# Patient Record
Sex: FEMALE | Race: WHITE | Employment: PART TIME | URBAN - NONMETROPOLITAN AREA
[De-identification: names, ages, dates, MRNs, and addresses within clinical notes are randomized per-mention and may not be internally consistent; named-entity substitution may affect disease eponyms.]

---

## 2021-10-06 ENCOUNTER — HOSPITAL ENCOUNTER (OUTPATIENT)
Age: 51
Discharge: HOME | End: 2021-10-06
Payer: COMMERCIAL

## 2021-10-06 ENCOUNTER — HOSPITAL ENCOUNTER (OUTPATIENT)
Dept: GENERAL RADIOLOGY | Age: 51
Discharge: HOME | End: 2021-10-06
Payer: COMMERCIAL

## 2021-10-06 ENCOUNTER — HOSPITAL ENCOUNTER (OUTPATIENT)
Dept: CT IMAGING | Age: 51
Discharge: HOME | End: 2021-10-06

## 2021-10-06 ENCOUNTER — OFFICE VISIT (OUTPATIENT)
Dept: ENT CLINIC | Age: 51
End: 2021-10-06
Payer: COMMERCIAL

## 2021-10-06 ENCOUNTER — PREP FOR PROCEDURE (OUTPATIENT)
Dept: ENT CLINIC | Age: 51
End: 2021-10-06

## 2021-10-06 VITALS
BODY MASS INDEX: 28.12 KG/M2 | RESPIRATION RATE: 16 BRPM | OXYGEN SATURATION: 97 % | HEIGHT: 66 IN | SYSTOLIC BLOOD PRESSURE: 124 MMHG | HEART RATE: 64 BPM | WEIGHT: 175 LBS | TEMPERATURE: 97 F | DIASTOLIC BLOOD PRESSURE: 84 MMHG

## 2021-10-06 DIAGNOSIS — Z01.818 PRE-OP TESTING: ICD-10-CM

## 2021-10-06 DIAGNOSIS — Z00.6 EXAMINATION FOR NORMAL COMPARISON FOR CLINICAL RESEARCH: ICD-10-CM

## 2021-10-06 DIAGNOSIS — J34.3 HYPERTROPHY OF BOTH INFERIOR NASAL TURBINATES: ICD-10-CM

## 2021-10-06 DIAGNOSIS — J34.89 NASAL OBSTRUCTION: ICD-10-CM

## 2021-10-06 DIAGNOSIS — J34.2 NASAL SEPTAL DEVIATION: ICD-10-CM

## 2021-10-06 DIAGNOSIS — S02.2XXA FRACTURE OF NASAL BONES, INITIAL ENCOUNTER FOR CLOSED FRACTURE: Primary | ICD-10-CM

## 2021-10-06 DIAGNOSIS — J34.89 CONCHA BULLOSA: ICD-10-CM

## 2021-10-06 DIAGNOSIS — Z01.818 PRE-OP TESTING: Primary | ICD-10-CM

## 2021-10-06 LAB
ALBUMIN SERPL-MCNC: 4.3 G/DL (ref 3.5–5.1)
ALP BLD-CCNC: 121 U/L (ref 38–126)
ALT SERPL-CCNC: 30 U/L (ref 11–66)
ANION GAP SERPL CALCULATED.3IONS-SCNC: 11 MEQ/L (ref 8–16)
AST SERPL-CCNC: 31 U/L (ref 5–40)
BASOPHILS # BLD: 0.6 %
BASOPHILS ABSOLUTE: 0 THOU/MM3 (ref 0–0.1)
BILIRUB SERPL-MCNC: 0.5 MG/DL (ref 0.3–1.2)
BUN BLDV-MCNC: 6 MG/DL (ref 7–22)
CALCIUM SERPL-MCNC: 9.6 MG/DL (ref 8.5–10.5)
CHLORIDE BLD-SCNC: 104 MEQ/L (ref 98–111)
CO2: 26 MEQ/L (ref 23–33)
CREAT SERPL-MCNC: 0.7 MG/DL (ref 0.4–1.2)
EKG ATRIAL RATE: 60 BPM
EKG P AXIS: 67 DEGREES
EKG P-R INTERVAL: 188 MS
EKG Q-T INTERVAL: 456 MS
EKG QRS DURATION: 98 MS
EKG QTC CALCULATION (BAZETT): 456 MS
EKG R AXIS: 20 DEGREES
EKG T AXIS: 28 DEGREES
EKG VENTRICULAR RATE: 60 BPM
EOSINOPHIL # BLD: 0.5 %
EOSINOPHILS ABSOLUTE: 0 THOU/MM3 (ref 0–0.4)
ERYTHROCYTE [DISTWIDTH] IN BLOOD BY AUTOMATED COUNT: 13.8 % (ref 11.5–14.5)
ERYTHROCYTE [DISTWIDTH] IN BLOOD BY AUTOMATED COUNT: 56.9 FL (ref 35–45)
GFR SERPL CREATININE-BSD FRML MDRD: 87 ML/MIN/1.73M2
GLUCOSE BLD-MCNC: 89 MG/DL (ref 70–108)
HCT VFR BLD CALC: 48.9 % (ref 37–47)
HEMOGLOBIN: 16.1 GM/DL (ref 12–16)
IMMATURE GRANS (ABS): 0.03 THOU/MM3 (ref 0–0.07)
IMMATURE GRANULOCYTES: 0.4 %
LYMPHOCYTES # BLD: 19 %
LYMPHOCYTES ABSOLUTE: 1.5 THOU/MM3 (ref 1–4.8)
MACROCYTES: PRESENT
MCH RBC QN AUTO: 36.3 PG (ref 26–33)
MCHC RBC AUTO-ENTMCNC: 32.9 GM/DL (ref 32.2–35.5)
MCV RBC AUTO: 110.1 FL (ref 81–99)
MONOCYTES # BLD: 7.2 %
MONOCYTES ABSOLUTE: 0.6 THOU/MM3 (ref 0.4–1.3)
NUCLEATED RED BLOOD CELLS: 0 /100 WBC
PLATELET # BLD: 252 THOU/MM3 (ref 130–400)
PLATELET ESTIMATE: ADEQUATE
PMV BLD AUTO: 9.9 FL (ref 9.4–12.4)
POTASSIUM SERPL-SCNC: 4 MEQ/L (ref 3.5–5.2)
RBC # BLD: 4.44 MILL/MM3 (ref 4.2–5.4)
SCAN OF BLOOD SMEAR: NORMAL
SEG NEUTROPHILS: 72.3 %
SEGMENTED NEUTROPHILS ABSOLUTE COUNT: 5.8 THOU/MM3 (ref 1.8–7.7)
SODIUM BLD-SCNC: 141 MEQ/L (ref 135–145)
TOTAL PROTEIN: 7 G/DL (ref 6.1–8)
WBC # BLD: 8 THOU/MM3 (ref 4.8–10.8)

## 2021-10-06 PROCEDURE — 99204 OFFICE O/P NEW MOD 45 MIN: CPT | Performed by: OTOLARYNGOLOGY

## 2021-10-06 PROCEDURE — 80053 COMPREHEN METABOLIC PANEL: CPT

## 2021-10-06 PROCEDURE — 85025 COMPLETE CBC W/AUTO DIFF WBC: CPT

## 2021-10-06 PROCEDURE — 36415 COLL VENOUS BLD VENIPUNCTURE: CPT

## 2021-10-06 PROCEDURE — 93005 ELECTROCARDIOGRAM TRACING: CPT

## 2021-10-06 PROCEDURE — 71046 X-RAY EXAM CHEST 2 VIEWS: CPT

## 2021-10-06 RX ORDER — AMOXICILLIN 500 MG/1
500 CAPSULE ORAL
COMMUNITY
Start: 2021-09-28 | End: 2021-10-06

## 2021-10-06 RX ORDER — FERROUS SULFATE 325(65) MG
TABLET ORAL
COMMUNITY
Start: 2021-09-02

## 2021-10-06 RX ORDER — FAMOTIDINE 40 MG/1
TABLET, FILM COATED ORAL NIGHTLY
COMMUNITY
Start: 2021-09-02

## 2021-10-06 RX ORDER — LISINOPRIL 20 MG/1
TABLET ORAL DAILY
COMMUNITY
Start: 2021-10-04

## 2021-10-06 RX ORDER — METOPROLOL SUCCINATE 25 MG/1
25 TABLET, EXTENDED RELEASE ORAL DAILY
COMMUNITY

## 2021-10-06 RX ORDER — AMLODIPINE BESYLATE 5 MG/1
5 TABLET ORAL DAILY
COMMUNITY
Start: 2021-10-04

## 2021-10-06 RX ORDER — HYDROCODONE BITARTRATE AND ACETAMINOPHEN 5; 325 MG/1; MG/1
TABLET ORAL
COMMUNITY
Start: 2021-09-28

## 2021-10-06 RX ORDER — OMEPRAZOLE 40 MG/1
CAPSULE, DELAYED RELEASE ORAL
COMMUNITY
Start: 2021-09-01

## 2021-10-06 NOTE — PROGRESS NOTES
In preparation for their surgical procedure above patient was screened for Obstructive Sleep Apnea (NASRIN) using the STOP-Bang Questionnaire by the Pre-Admission Testing department. This is a pre-surgical screening tool for patient safety and serves as a recommendation, this WILL NOT cause cancellation of surgery. STOP-Bang Questionnaire  * Do you currently see a pulmonologist?  No     If yes STOP, do not complete. Patient follows with  .    1. Do you snore loudly (able to be heard in the next room)? No    2. Do you often feel tired or sleepy during the daytime? No       3. Has anyone ever told you that you stop breathing during your sleep? No    4. Do you have or are you being treated for high blood pressure? Yes      5. BMI more than 35? BMI (Calculated): 28.3        No    6. Age over 48 years? 46 y.o. Yes    7. Neck Circumference greater than 17 inches for male or 16 inches for female? Measured           (visits only)            Not Applicable    8. Gender Male? No      TOTAL SCORE: 2    NASRIN - Low Risk : Yes to 0 - 2 questions  NASRIN - Intermediate Risk : Yes to 3 - 4 questions  NASRIN - High Risk : Yes to 5 - 8 questions    Adapted from:   STOP Questionnaire: A Tool to Screen Patients for Obstructive Sleep Apnea   LUCILLE Roldan.C.P.C., Gadiel Mariee M.B.B.S., Angelique Ascencio M.D., [facility]. Sameera Williamson, Ph.D., STEPHEN Martinez.B.B.S., Shirin Tomas M.Sc., Anh Heaton M.D., [facility]. LUCILLE Benedict.C.P.C.    Anesthesiology 2008; 522:753-55 Copyright 2008, the [address] of [employer]esthesi[employer], [facility].   ----------------------------------------------------------------------------------------------------------------

## 2021-10-06 NOTE — PATIENT INSTRUCTIONS
Nasal and Sinus Irrigations (Isotonic Saline)  Purpose  Nasal and sinus irrigations help to remove crusts, dried blood, clots, mucus, etc. from  the nose. In addition, these irrigations may also decrease the swelling in the nasal and  sinus linings. These irrigations are often used on a long-term basis to keep the nose  and sinuses clean. You can purchase a bottle and saline packets at any pharmacy or  you can make your own (see below). Isotonic Saline  1 teaspoon of marcie/pickling salt (not table salt)  1/2 teaspoon of baking soda  1 quart of water (tap water OK; bottled or distilled water OK; not well water)    Directions   Use a large piston-type syringe (also known as an irrigating syringe) or a nasal  squeeze bottle (see below). Avoid bulb-type syringes.  Place the tip of the syringe (or squeeze bottle) at the nostril and irrigate with  the saline mixture. The irrigation should be rigorous, since it cleans the nose  by mechanically removing debris, crusts, mucus, etc. The irrigation should be  forceful  like washing dirt from a driveway with water from a hose.  Irrigate each nostril with 1 cup of the saline mixture 3-4 times per day.  After each irrigation, simply wash the syringe (or squeeze bottle) with  ordinary tap water and mild soap. Let the syringe air-dry. Separate the barrel  and piston of the syringe for cleaning.  When the syringe (or squeeze bottle) becomes permanently soiled or if it  becomes difficult to use, replace it with a new one.  If using for ? 3 months, please dispense of your bottle and obtain a new one.

## 2021-10-06 NOTE — PROGRESS NOTES
[address], NOSE AND THROAT  [facility]  Dept: [phone]  Dept Fax: [phone]: [phone]       Dear No ref. provider found, thank you for referring Teresa Augustin in consultation for a chief complaint of: nasal bone fracture . My full evaluation is as follows:     HPI:     As you recall, Teresa Augustin is a 46 y.o. female who presents today for evaluation of her nasal bone fracture. On September 28 she fell on her deck and hit her face. She had epistaxis for several minutes afterwards. She had bilateral periorbital bruising and facial swelling. She could not breathe out of her nose for several days afterwards. Her nasal airway has improved a little bit, but is not back to normal. Her nasal bones are also visibly displaced. She denies any vision changes. She is currently trying to quit smoking, but still smokes half pack per day. She has no other medical problems. History:     No Known Allergies  Current Outpatient Medications   Medication Sig Dispense Refill    amLODIPine (NORVASC) 5 MG tablet 5 mg daily      amoxicillin (AMOXIL) 500 MG capsule 500 mg 1-2 times a day for 10 days. Started 9/28      famotidine (PEPCID) 40 MG tablet TAKE 1 TABLET BY MOUTH EVERYDAY AT BEDTIME      ferrous sulfate (IRON 325) 325 (65 Fe) MG tablet TAKE 1 TABLET BY MOUTH EVERY DAY      HYDROcodone-acetaminophen (NORCO) 5-325 MG per tablet TAKE 1 (ONE) TABLET BY MOUTH EVERY 6 (SIX) HOURS AS NEEDED FOR PAIN (DAYS SUPPLY PER FILL  5) .       lisinopril (PRINIVIL;ZESTRIL) 20 MG tablet       omeprazole (PRILOSEC) 40 MG delayed release capsule TAKE 1 CAPSULE BY MOUTH TWICE A DAY      oxyCODONE-acetaminophen (PERCOCET) 5-325 MG per tablet Take 1 tablet by mouth every 6 hours as needed for Pain 30 tablet 0    b complex vitamins capsule Take 1 capsule by mouth daily      calcium carbonate (OSCAL) 500 MG TABS tablet Take 500 mg by mouth daily      Vitamin D (CHOLECALCIFEROL) 1000 UNITS CAPS capsule Take 1,000 Units by mouth daily      Multiple Vitamins-Minerals (THERAPEUTIC MULTIVITAMIN-MINERALS) tablet Take 1 tablet by mouth daily       No current facility-administered medications for this visit. Past Medical History:   Diagnosis Date    H/O bariatric surgery     2013      Past Surgical History:   Procedure Laterality Date    ANUS SURGERY  2015    BARIATRIC SURGERY  13    [place]     SECTION  80, 80    COLONOSCOPY      TUBAL LIGATION  90     Family History   Problem Relation Age of Onset    Heart Disease Father     Early Death Father 52        MI    Diabetes Father     Diabetes Sister     Cancer Maternal Aunt     Heart Disease Paternal Aunt     Diabetes Paternal Aunt     Cancer Maternal Grandmother     Heart Disease Paternal Grandmother     Diabetes Paternal Grandfather      Social History     Tobacco Use    Smoking status: Current Every Day Smoker     Packs/day: 0.50     Types: Cigarettes    Smokeless tobacco: Never Used   Substance Use Topics    Alcohol use: Yes     Alcohol/week: 0.0 standard drinks     Comment: social       All of the past medical history, past surgical history, family history,social history, allergies and current medications were reviewed with the patient. Review of Systems      A complete review of systems was obtained by the patient intake form, which is attached to this visit. Objective:   /84 (Site: Left Upper Arm, Position: Sitting)   Pulse 64   Temp 97 °F (36.1 °C) (Infrared)   Resp 16   Ht 5' 6\" (1.676 m)   Wt 175 lb (79.4 kg)   SpO2 97%   BMI 28.25 kg/m²     PHYSICAL EXAM  ABNORMAL OTOLARYNGOLOGIC EXAM FINDINGS: Her bony dorsum is deviated to the left with palpably displaced shards of bone. This is very tender to palpation. On anterior rhinoscopy, she has septal swelling and complete obstruction of her nasal airways bilaterally.  There is periorbital ecchymosis and she is tender over her infraorbital rims. Extraocular motions are intact. No facial numbness. OTHER PERTINENT EXAM FINDINGS:  GENERAL: Awake, NAD, Non-toxic appearing. Normal voice quality  NEUROLOGICAL: GCS 15, Cranial nerves II-VI, VIII-XII grossly intact,  Facial nerve (VII) w/ House-Brackman Grade 1 of 6 bilaterally, No evidence of nystagmus or gross asymmetry    EARS: Pinna well-formed,  EAC non-stenotic w/o cerumen impaction AU,  TM's intact AU w/o effusion or active infection appreciated  EYES: EOMI, No ptosis appreciated   NOSE: Dorsum w/o scar or deformity,  No mucopurulence appreciated, Patent nasal airways bilaterally. No inferior turbinate hypertrophy. No septal deviation. ORAL CAVITY: No mucosal masses/lesions appreciated, Tongue with FROM. Appropriate OLGA LIDIA w/o trismus. OROPHARYNX: No mucosal masses or lesions appreciated. Symmetric palatal elevation w/o draping. NECK: Soft, supple. No crepitus or masses appreciated,  Trachea midline. No thyromegaly or thyroid tenderness. Data: The relevant prior clinic notes were reviewed today, including the referring physicians notes. Imaging: CT face was obtained in the emergency room on September 28. I personally reviewed these images. They revealed a comminuted, displaced nasal bone fracture with the bony mid vault deviated to the left. She also has a large left-sided kiana bullosa, and an absent right sided frontal sinus. She has a chronic rightward septal deviation secondary to the kiana bullosa, and bilateral inferior turbinate hypertrophy. Assessment & Plan   Baltazar Salazar is a 46 y.o. female with:   1. Fracture of nasal bones, initial encounter for closed fracture    2. Nasal obstruction    3. Kiana bullosa    4. Hypertrophy of both inferior nasal turbinates    5. Nasal septal deviation         We discussed treatment options for the nasal bone fracture.  Since she has significant nasal obstruction at this time, and there is an external deformity, we will proceed with closed nasal reduction. We had a detailed discussion of the risk, benefits, and alternatives to the surgery. The risk include infection, bleeding, worsening or damaging of the nerves that help with smell, need for further surgery, damage to adjacent structures including the eyes and brain. Changes in vision, double vision, spinal fluid leak, hole in the septum are very unlikely but theoretically possible. Informed consent was obtained and signed in clinic today. The surgery will be scheduled shortly. My advanced practice providers will see her back 7 to 10 days postoperatively. We discussed nasal saline irrigations to aid in the healing process. I recommend no strenuous activity or contact sports for the next few weeks. PROCEDURAL INFORMED CONSENT FOR OPERATION/PROCEDURE    1. The indications and rationale for the procedure, the nature of the procedure, and the extent of the procedure have been explained. 2. The likelihood of success and significant risks that occur with any reasonable frequency, or  have been explained. With any procedure, there are potential adverse events that might occur, that are rare and cannot be foreseen. 3. Alternative methods of treatment have either already been exhausted or explained. Potential benefits and risks of such alternative treatments have been explained. The option to pursue such alternatives has been offered. 4. Expected immediate and long-term consequences to the patient's health have been discussed, where applicable. 5. No guarantees of any kind were made, including, but not limited to, successful outcomes or lack of complications. 6. All questions were answered, and an invitation made to call if other questions arise        No follow-ups on file. Thank you for involving me in this patient's care.  Please do not hesitate to call with any questions or concerns. Sincerely,    Andreia San M.D. Otolaryngology-Head and Neck Surgery    **This report has been created using voice recognition software. It may contain minor errors which are inherent in voice recognition technology. **

## 2021-10-07 ENCOUNTER — ANESTHESIA EVENT (OUTPATIENT)
Dept: OPERATING ROOM | Age: 51
End: 2021-10-07
Payer: COMMERCIAL

## 2021-10-08 ENCOUNTER — ANESTHESIA (OUTPATIENT)
Dept: OPERATING ROOM | Age: 51
End: 2021-10-08
Payer: COMMERCIAL

## 2021-10-08 ENCOUNTER — HOSPITAL ENCOUNTER (OUTPATIENT)
Age: 51
Setting detail: OUTPATIENT SURGERY
Discharge: HOME | End: 2021-10-08
Attending: OTOLARYNGOLOGY | Admitting: OTOLARYNGOLOGY
Payer: COMMERCIAL

## 2021-10-08 VITALS
SYSTOLIC BLOOD PRESSURE: 110 MMHG | WEIGHT: 176.2 LBS | TEMPERATURE: 97.5 F | BODY MASS INDEX: 28.32 KG/M2 | DIASTOLIC BLOOD PRESSURE: 58 MMHG | OXYGEN SATURATION: 96 % | RESPIRATION RATE: 16 BRPM | HEART RATE: 60 BPM | HEIGHT: 66 IN

## 2021-10-08 VITALS — TEMPERATURE: 98.6 F | DIASTOLIC BLOOD PRESSURE: 59 MMHG | OXYGEN SATURATION: 100 % | SYSTOLIC BLOOD PRESSURE: 105 MMHG

## 2021-10-08 DIAGNOSIS — S02.2XXA CLOSED FRACTURE OF NASAL BONE, INITIAL ENCOUNTER: Primary | ICD-10-CM

## 2021-10-08 PROCEDURE — 7100000000 HC PACU RECOVERY - FIRST 15 MIN: Performed by: OTOLARYNGOLOGY

## 2021-10-08 PROCEDURE — 7100000001 HC PACU RECOVERY - ADDTL 15 MIN: Performed by: OTOLARYNGOLOGY

## 2021-10-08 PROCEDURE — 2580000003 HC RX 258: Performed by: OTOLARYNGOLOGY

## 2021-10-08 PROCEDURE — 7100000010 HC PHASE II RECOVERY - FIRST 15 MIN: Performed by: OTOLARYNGOLOGY

## 2021-10-08 PROCEDURE — 6370000000 HC RX 637 (ALT 250 FOR IP): Performed by: OTOLARYNGOLOGY

## 2021-10-08 PROCEDURE — 3600000002 HC SURGERY LEVEL 2 BASE: Performed by: OTOLARYNGOLOGY

## 2021-10-08 PROCEDURE — 2709999900 HC NON-CHARGEABLE SUPPLY: Performed by: OTOLARYNGOLOGY

## 2021-10-08 PROCEDURE — 6360000002 HC RX W HCPCS

## 2021-10-08 PROCEDURE — C1713 ANCHOR/SCREW BN/BN,TIS/BN: HCPCS | Performed by: OTOLARYNGOLOGY

## 2021-10-08 PROCEDURE — 21320 CLSD TX NSL FX W/MNPJ&STABLJ: CPT | Performed by: OTOLARYNGOLOGY

## 2021-10-08 PROCEDURE — 2500000003 HC RX 250 WO HCPCS

## 2021-10-08 PROCEDURE — 7100000011 HC PHASE II RECOVERY - ADDTL 15 MIN: Performed by: OTOLARYNGOLOGY

## 2021-10-08 PROCEDURE — 3600000012 HC SURGERY LEVEL 2 ADDTL 15MIN: Performed by: OTOLARYNGOLOGY

## 2021-10-08 PROCEDURE — 3700000001 HC ADD 15 MINUTES (ANESTHESIA): Performed by: OTOLARYNGOLOGY

## 2021-10-08 PROCEDURE — 3700000000 HC ANESTHESIA ATTENDED CARE: Performed by: OTOLARYNGOLOGY

## 2021-10-08 RX ORDER — HYDROCODONE BITARTRATE AND ACETAMINOPHEN 5; 325 MG/1; MG/1
1 TABLET ORAL EVERY 4 HOURS PRN
Qty: 18 TABLET | Refills: 0 | Status: SHIPPED | OUTPATIENT
Start: 2021-10-08 | End: 2021-10-11

## 2021-10-08 RX ORDER — FENTANYL CITRATE 50 UG/ML
50 INJECTION, SOLUTION INTRAMUSCULAR; INTRAVENOUS EVERY 5 MIN PRN
Status: DISCONTINUED | OUTPATIENT
Start: 2021-10-08 | End: 2021-10-08 | Stop reason: HOSPADM

## 2021-10-08 RX ORDER — DEXAMETHASONE SODIUM PHOSPHATE 10 MG/ML
INJECTION, EMULSION INTRAMUSCULAR; INTRAVENOUS PRN
Status: DISCONTINUED | OUTPATIENT
Start: 2021-10-08 | End: 2021-10-08 | Stop reason: SDUPTHER

## 2021-10-08 RX ORDER — FENTANYL CITRATE 50 UG/ML
INJECTION, SOLUTION INTRAMUSCULAR; INTRAVENOUS PRN
Status: DISCONTINUED | OUTPATIENT
Start: 2021-10-08 | End: 2021-10-08 | Stop reason: SDUPTHER

## 2021-10-08 RX ORDER — SUCCINYLCHOLINE/SOD CL,ISO/PF 200MG/10ML
SYRINGE (ML) INTRAVENOUS PRN
Status: DISCONTINUED | OUTPATIENT
Start: 2021-10-08 | End: 2021-10-08 | Stop reason: SDUPTHER

## 2021-10-08 RX ORDER — LABETALOL 20 MG/4 ML (5 MG/ML) INTRAVENOUS SYRINGE
5 EVERY 10 MIN PRN
Status: DISCONTINUED | OUTPATIENT
Start: 2021-10-08 | End: 2021-10-08 | Stop reason: HOSPADM

## 2021-10-08 RX ORDER — MIDAZOLAM HYDROCHLORIDE 1 MG/ML
INJECTION INTRAMUSCULAR; INTRAVENOUS PRN
Status: DISCONTINUED | OUTPATIENT
Start: 2021-10-08 | End: 2021-10-08 | Stop reason: SDUPTHER

## 2021-10-08 RX ORDER — PROPOFOL 10 MG/ML
INJECTION, EMULSION INTRAVENOUS PRN
Status: DISCONTINUED | OUTPATIENT
Start: 2021-10-08 | End: 2021-10-08 | Stop reason: SDUPTHER

## 2021-10-08 RX ORDER — SODIUM CHLORIDE 0.9 % (FLUSH) 0.9 %
5-40 SYRINGE (ML) INJECTION PRN
Status: CANCELLED | OUTPATIENT
Start: 2021-10-08

## 2021-10-08 RX ORDER — SODIUM CHLORIDE 0.9 % (FLUSH) 0.9 %
5-40 SYRINGE (ML) INJECTION EVERY 12 HOURS SCHEDULED
Status: DISCONTINUED | OUTPATIENT
Start: 2021-10-08 | End: 2021-10-08 | Stop reason: HOSPADM

## 2021-10-08 RX ORDER — FENTANYL CITRATE 50 UG/ML
25 INJECTION, SOLUTION INTRAMUSCULAR; INTRAVENOUS EVERY 5 MIN PRN
Status: DISCONTINUED | OUTPATIENT
Start: 2021-10-08 | End: 2021-10-08 | Stop reason: HOSPADM

## 2021-10-08 RX ORDER — ROCURONIUM BROMIDE 10 MG/ML
INJECTION, SOLUTION INTRAVENOUS PRN
Status: DISCONTINUED | OUTPATIENT
Start: 2021-10-08 | End: 2021-10-08 | Stop reason: SDUPTHER

## 2021-10-08 RX ORDER — SODIUM CHLORIDE 9 MG/ML
25 INJECTION, SOLUTION INTRAVENOUS PRN
Status: CANCELLED | OUTPATIENT
Start: 2021-10-08

## 2021-10-08 RX ORDER — ONDANSETRON 2 MG/ML
4 INJECTION INTRAMUSCULAR; INTRAVENOUS
Status: DISCONTINUED | OUTPATIENT
Start: 2021-10-08 | End: 2021-10-08 | Stop reason: HOSPADM

## 2021-10-08 RX ORDER — SODIUM CHLORIDE 0.9 % (FLUSH) 0.9 %
5-40 SYRINGE (ML) INJECTION PRN
Status: DISCONTINUED | OUTPATIENT
Start: 2021-10-08 | End: 2021-10-08 | Stop reason: HOSPADM

## 2021-10-08 RX ORDER — LIDOCAINE HYDROCHLORIDE 20 MG/ML
INJECTION, SOLUTION EPIDURAL; INFILTRATION; INTRACAUDAL; PERINEURAL PRN
Status: DISCONTINUED | OUTPATIENT
Start: 2021-10-08 | End: 2021-10-08 | Stop reason: SDUPTHER

## 2021-10-08 RX ORDER — ONDANSETRON 2 MG/ML
INJECTION INTRAMUSCULAR; INTRAVENOUS PRN
Status: DISCONTINUED | OUTPATIENT
Start: 2021-10-08 | End: 2021-10-08 | Stop reason: SDUPTHER

## 2021-10-08 RX ORDER — SODIUM CHLORIDE 9 MG/ML
25 INJECTION, SOLUTION INTRAVENOUS PRN
Status: DISCONTINUED | OUTPATIENT
Start: 2021-10-08 | End: 2021-10-08 | Stop reason: HOSPADM

## 2021-10-08 RX ORDER — HYDROCODONE BITARTRATE AND ACETAMINOPHEN 5; 325 MG/1; MG/1
1 TABLET ORAL ONCE
Status: COMPLETED | OUTPATIENT
Start: 2021-10-08 | End: 2021-10-08

## 2021-10-08 RX ORDER — SODIUM CHLORIDE 0.9 % (FLUSH) 0.9 %
5-40 SYRINGE (ML) INJECTION EVERY 12 HOURS SCHEDULED
Status: CANCELLED | OUTPATIENT
Start: 2021-10-08

## 2021-10-08 RX ADMIN — Medication 140 MG: at 09:05

## 2021-10-08 RX ADMIN — LIDOCAINE HYDROCHLORIDE 100 MG: 20 INJECTION, SOLUTION EPIDURAL; INFILTRATION; INTRACAUDAL; PERINEURAL at 09:05

## 2021-10-08 RX ADMIN — FENTANYL CITRATE 50 MCG: 50 INJECTION, SOLUTION INTRAMUSCULAR; INTRAVENOUS at 09:05

## 2021-10-08 RX ADMIN — HYDROCODONE BITARTRATE AND ACETAMINOPHEN 1 TABLET: 5; 325 TABLET ORAL at 10:25

## 2021-10-08 RX ADMIN — SODIUM CHLORIDE 1000 ML: 9 INJECTION, SOLUTION INTRAVENOUS at 08:09

## 2021-10-08 RX ADMIN — ONDANSETRON HYDROCHLORIDE 4 MG: 4 INJECTION, SOLUTION INTRAMUSCULAR; INTRAVENOUS at 09:14

## 2021-10-08 RX ADMIN — FENTANYL CITRATE 50 MCG: 50 INJECTION, SOLUTION INTRAMUSCULAR; INTRAVENOUS at 09:25

## 2021-10-08 RX ADMIN — MIDAZOLAM 2 MG: 1 INJECTION INTRAMUSCULAR; INTRAVENOUS at 08:58

## 2021-10-08 RX ADMIN — PROPOFOL 200 MG: 10 INJECTION, EMULSION INTRAVENOUS at 09:05

## 2021-10-08 RX ADMIN — DEXAMETHASONE SODIUM PHOSPHATE 10 MG: 10 INJECTION, EMULSION INTRAMUSCULAR; INTRAVENOUS at 08:58

## 2021-10-08 RX ADMIN — ROCURONIUM BROMIDE 5 MG: 10 INJECTION INTRAVENOUS at 09:05

## 2021-10-08 ASSESSMENT — PAIN SCALES - GENERAL
PAINLEVEL_OUTOF10: 0
PAINLEVEL_OUTOF10: 7
PAINLEVEL_OUTOF10: 3
PAINLEVEL_OUTOF10: 3
PAINLEVEL_OUTOF10: 7

## 2021-10-08 ASSESSMENT — PULMONARY FUNCTION TESTS
PIF_VALUE: 21
PIF_VALUE: 2
PIF_VALUE: 22
PIF_VALUE: 21
PIF_VALUE: 2
PIF_VALUE: 1
PIF_VALUE: 2
PIF_VALUE: 1
PIF_VALUE: 26
PIF_VALUE: 23
PIF_VALUE: 18
PIF_VALUE: 1
PIF_VALUE: 22
PIF_VALUE: 22
PIF_VALUE: 2
PIF_VALUE: 24
PIF_VALUE: 18
PIF_VALUE: 1
PIF_VALUE: 21
PIF_VALUE: 22
PIF_VALUE: 24
PIF_VALUE: 22
PIF_VALUE: 12
PIF_VALUE: 11
PIF_VALUE: 19
PIF_VALUE: 22

## 2021-10-08 ASSESSMENT — PAIN - FUNCTIONAL ASSESSMENT: PAIN_FUNCTIONAL_ASSESSMENT: 0-10

## 2021-10-08 ASSESSMENT — PAIN DESCRIPTION - PAIN TYPE: TYPE: SURGICAL PAIN

## 2021-10-08 ASSESSMENT — PAIN DESCRIPTION - LOCATION: LOCATION: FACE

## 2021-10-08 NOTE — H&P
[address]AR, NO[place] AND THROAT  [facility] [place]  Dept: [phone]  Dept Fax: [phone]  Loc: [phone]       Day of Surgery Update     She denies any changes to condition or medical history since last clinic visit. Plan: Proceed to OR for planned procedure. All questions answered in pre-op. Initial HPI:    HPI:     As you recall, Mitchell Mackay is a 46 y.o. female who presents today for evaluation of her nasal bone fracture. On September 28 she fell on her deck and hit her face. She had epistaxis for several minutes afterwards. She had bilateral periorbital bruising and facial swelling. She could not breathe out of her nose for several days afterwards. Her nasal airway has improved a little bit, but is not back to normal. Her nasal bones are also visibly displaced. She denies any vision changes. She is currently trying to quit smoking, but still smokes half pack per day. She has no other medical problems.       History:     No Known Allergies  Current Facility-Administered Medications   Medication Dose Route Frequency Provider Last Rate Last Admin    0.9 % sodium chloride infusion  25 mL IntraVENous PRN Alessio Hinojosa  mL/hr at 10/08/21 0809 1,000 mL at 10/08/21 0809    sodium chloride flush 0.9 % injection 5-40 mL  5-40 mL IntraVENous 2 times per day Alessio Hinojosa MD        sodium chloride flush 0.9 % injection 5-40 mL  5-40 mL IntraVENous PRN Alessio Hinojosa MD        HYDROmorphone (DILAUDID) injection 0.25 mg  0.25 mg IntraVENous Q5 Min PRN Dong Hanna MD        HYDROmorphone (DILAUDID) injection 0.5 mg  0.5 mg IntraVENous Q5 Min PRN Izella Collet Imm, MD        fentaNYL (SUBLIMAZE) injection 25 mcg  25 mcg IntraVENous Q5 Min PRN Izella Collet Imm, MD        fentaNYL (SUBLIMAZE) injection 50 mcg  50 mcg IntraVENous Q5 Min PRN Izella Collet Imm, MD        [facility]dansetron [facility]) injection 4 mg  4 mg IntraVENous Once PRN Lady Perales MD        labetalol (NORMODYNE;TRANDATE) injection syringe 5 mg  5 mg IntraVENous Q10 Min PRN Lady Perales MD         Past Medical History:   Diagnosis Date    Anemia     Davis esophagus     H/O bariatric surgery     2013    Hypertension       Past Surgical History:   Procedure Laterality Date    ANUS SURGERY  2015    tear in the lining    BARIATRIC SURGERY  13    [place]     SECTION  85, 80    COLONOSCOPY      ENDOSCOPY, COLON, DIAGNOSTIC      TUBAL LIGATION  90     Family History   Problem Relation Age of Onset    Heart Disease Father     Early Death Father 52        MI    Diabetes Father     Diabetes Sister     Cancer Maternal Aunt     Heart Disease Paternal Aunt     Diabetes Paternal Aunt     Cancer Maternal Grandmother     Heart Disease Paternal Grandmother     Diabetes Paternal Grandfather      Social History     Tobacco Use    Smoking status: Current Every Day Smoker     Packs/day: 0.25     Types: Cigarettes    Smokeless tobacco: Never Used   Substance Use Topics    Alcohol use: Yes     Alcohol/week: 0.0 standard drinks     Comment: social       All of the past medical history, past surgical history, family history,social history, allergies and current medications were reviewed with the patient. Review of Systems      A complete review of systems was obtained by the patient intake form, which is attached to this visit. Objective:   /82   Pulse 65   Temp 98.1 °F (36.7 °C) (Temporal)   Resp 16   Ht 5' 6\" (1.676 m)   Wt 176 lb 3.2 oz (79.9 kg)   SpO2 97%   BMI 28.44 kg/m²     PHYSICAL EXAM  ABNORMAL OTOLARYNGOLOGIC EXAM FINDINGS: Her bony dorsum is deviated to the left with palpably displaced shards of bone. This is very tender to palpation. On anterior rhinoscopy, she has septal swelling and complete obstruction of her nasal airways bilaterally. There is periorbital ecchymosis and she is tender over her infraorbital rims. of the nerves that help with smell, need for further surgery, damage to adjacent structures including the eyes and brain. Changes in vision, double vision, spinal fluid leak, hole in the septum are very unlikely but theoretically possible. Informed consent was obtained and signed in clinic today. The surgery will be scheduled shortly. My advanced practice providers will see her back 7 to 10 days postoperatively. We discussed nasal saline irrigations to aid in the healing process. I recommend no strenuous activity or contact sports for the next few weeks. PROCEDURAL INFORMED CONSENT FOR OPERATION/PROCEDURE    1. The indications and rationale for the procedure, the nature of the procedure, and the extent of the procedure have been explained. 2. The likelihood of success and significant risks that occur with any reasonable frequency, or  have been explained. With any procedure, there are potential adverse events that might occur, that are rare and cannot be foreseen. 3. Alternative methods of treatment have either already been exhausted or explained. Potential benefits and risks of such alternative treatments have been explained. The option to pursue such alternatives has been offered. 4. Expected immediate and long-term consequences to the patient's health have been discussed, where applicable. 5. No guarantees of any kind were made, including, but not limited to, successful outcomes or lack of complications. 6. All questions were answered, and an invitation made to call if other questions arise        No follow-ups on file. Thank you for involving me in this patient's care. Please do not hesitate to call with any questions or concerns. Sincerely,    Eber Jon M.D. Otolaryngology-Head and Neck Surgery    **This report has been created using voice recognition software. It may contain minor errors which are inherent in voice recognition technology. **

## 2021-10-08 NOTE — ANESTHESIA PRE PROCEDURE
Department of Anesthesiology  Preprocedure Note       Name:  Fabián Mc   Age:  46 y.o.  :  1970                                          MRN:  [id number]         Date:  10/8/2021      Surgeon: Cari Brasher):  Mirela Willingham MD    Procedure: Procedure(s):  CLOSED REDUCTION OF NASAL FRACTURE WITH SPLINT    Medications prior to admission:   Prior to Admission medications    Medication Sig Start Date End Date Taking?  Authorizing Provider   amLODIPine (NORVASC) 5 MG tablet 5 mg daily 10/4/21  Yes Historical Provider, MD   famotidine (PEPCID) 40 MG tablet nightly Sometimes takes twice daily 21  Yes Historical Provider, MD   ferrous sulfate (IRON 325) 325 (65 Fe) MG tablet TAKE 1 TABLET BY MOUTH EVERY DAY 21  Yes Historical Provider, MD   lisinopril (PRINIVIL;ZESTRIL) 20 MG tablet daily  10/4/21  Yes Historical Provider, MD   omeprazole (PRILOSEC) 40 MG delayed release capsule TAKE 1 CAPSULE BY MOUTH TWICE A DAY 21  Yes Historical Provider, MD   metoprolol succinate (TOPROL XL) 25 MG extended release tablet Take 25 mg by mouth daily   Yes Historical Provider, MD   HYDROcodone-acetaminophen (NORCO) 5-325 MG per tablet TAKE 1 (ONE) TABLET BY MOUTH EVERY 6 (SIX) HOURS AS NEEDED FOR PAIN (DAYS SUPPLY PER FILL  5) . 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       Past Medical History:        Diagnosis Date    Anemia     Davis esophagus     H/O bariatric surgery 2013    Hypertension        Past Surgical History:        Procedure Laterality Date    ANUS SURGERY  2015    tear in the lining    BARIATRIC SURGERY  13    [place]     SECTION  80, 90    COLONOSCOPY      ENDOSCOPY, COLON, DIAGNOSTIC      TUBAL LIGATION  90       Social History:    Social History     Tobacco Use    Smoking status: Current Every Day Smoker     Packs/day: 0.25     Types: Cigarettes    Smokeless tobacco: Never Used   Substance Use Topics    Alcohol use: Yes     Alcohol/week: 0.0 standard drinks     Comment: social                                Ready to quit: Not Answered  Counseling given: Not Answered      Vital Signs (Current):   Vitals:    10/06/21 1221 10/08/21 0736   BP:  125/82   Pulse:  65   Resp:  16   Temp:  98.1 °F (36.7 °C)   TempSrc:  Temporal   SpO2:  97%   Weight: 175 lb (79.4 kg) 176 lb 3.2 oz (79.9 kg)   Height: 5' 6\" (1.676 m) 5' 6\" (1.676 m)                                              BP Readings from Last 3 Encounters:   10/08/21 125/82   10/06/21 124/84   09/10/15 120/80       NPO Status: Time of last liquid consumption: 615 (sips with b/p meds)                        Time of last solid consumption: 1730                        Date of last liquid consumption: 10/08/21                        Date of last solid food consumption: 10/07/21    BMI:   Wt Readings from Last 3 Encounters:   10/08/21 176 lb 3.2 oz (79.9 kg)   10/06/21 175 lb (79.4 kg)   09/10/15 186 lb (84.4 kg)     Body mass index is 28.44 kg/m².     CBC:   Lab Results   Component Value Date    WBC 8.0 10/06/2021    RBC 4.44 10/06/2021    HGB 16.1 10/06/2021    HCT 48.9 10/06/2021    .1 10/06/2021    RDW 15.0 2015     10/06/2021       CMP:   Lab Results   Component Value Date     10/06/2021    K 4.0 10/06/2021     10/06/2021    CO2 26 10/06/2021    BUN 6 10/06/2021    CREATININE 0.7 10/06/2021    LABGLOM 87 10/06/2021    GLUCOSE 89 10/06/2021    PROT 7.0

## 2021-10-08 NOTE — ANESTHESIA POSTPROCEDURE EVALUATION
Department of Anesthesiology  Postprocedure Note    Patient: Sudha Booker  MRN: [id number]  YOB: 1970  Date of evaluation: 10/8/2021  Time:  1:48 PM     Procedure Summary     Date: 10/08/21 Room / Location: [facility]  Natalya Yeageri    Anesthesia Start: 0859 Anesthesia Stop: 0932    Procedure: CLOSED REDUCTION OF NASAL FRACTURE WITH SPLINT (N/A ) Diagnosis: (FRACTURE OF NASAL BONES, CLOSD)    Surgeons: Manjula Medina MD Responsible Provider: Sury Barrios MD    Anesthesia Type: general ASA Status: 2          Anesthesia Type: general    Stacia Phase I: Stacia Score: 10    Stacia Phase II: Stacia Score: 10    Last vitals: Reviewed and per EMR flowsheets.        Anesthesia Post Evaluation    Patient location during evaluation: PACU  Patient participation: complete - patient participated  Level of consciousness: awake and alert  Airway patency: patent  Nausea & Vomiting: no nausea  Complications: no  Cardiovascular status: blood pressure returned to baseline and hemodynamically stable  Respiratory status: acceptable and spontaneous ventilation  Hydration status: euvolemic

## 2021-10-08 NOTE — PROGRESS NOTES
2598  Pt. Awake and oriented on adm. To pacu. Pt. Denies pain. Nasal splint intact and no nasal drainage noted. 0935  Pt. Voided appx. 200ml clear yellow urine per bedpan. 0945  Pt. Taking ice chips without difficulty. 1000  pacu criteria met. Transfer to [facility].

## 2021-10-08 NOTE — OP NOTE
Otolaryngology-Head and Neck Surgery Operative Note  Surgeon: Andreia San M.D. Patient: Merced Duckworth  YOB: 1970  MRN: [id number]    Date of Procedure: 10/8/2021    Pre-Op Diagnosis: FRACTURE OF NASAL BONES, CLOSD    Post-Op Diagnosis: Same       Procedure(s):  CLOSED REDUCTION OF NASAL FRACTURE WITH SPLINT      Surgeon(s):  Andreia San MD    Assistant:   * No surgical staff found *    Anesthesia: General General endotracheal anesthesia    Estimated Blood Loss (mL): 10    Complications: None immediate    Specimens:   * No specimens in log *    Implants:  None      Drains: None     INDICATIONS FOR PROCEDURE: Merced Duckworth is a 46 y.o. female with [address], CLOSD. The risks, benefits, complications, treatment options and expected outcomes were discussed with her in detail both in clinic and again today. The possibilities of complication related to the procedure and anethesia were reiterated, and the consent form was updated appropriately. FINDINGS: leftward displacement of nasal bones at midvault. Good reduction was achieved. Nasopore placed on the left. Aquaplast case placed. OPERATIVE DESCRIPTION:   After informed consent was obtained, the patient was taken from the preoperative holding area to the operating room, and placed in supine position on the operating table where general anesthesia was induced, and an LMA was placed. Once the patient was anesthetized, timeout was called to confirm the correct patient and procedure. The patient positioned in the room and on the bed. The Afrin pledgets were placed into the bilateral nasal cavity and a total of 6 cc of 1% Marcaine was injected into the vestibule and dorsum of the nose bilaterally. The patient was then draped in the usual fashion. Time out was then performed and all in the room were in agreement.  Afrin pledgets were then removed and the Boies elevator was then carefully used to outfracture the left side and infracture the right side returning the nose to its native midline position. Once the nose had been fully reduced with the help of bimanual palpation, the nose was dressed with Steri-Strips and a trimmed Thermo Splint. Additional Steri-Strips were placed on top of the Thermo Splint. Lastly, nasal and oral suctioning was performed, all instruments were removed and the care of the patient was turned over to Anesthesia. They proceeded to extubate her and transport her to PACU in stable condition. I was present for and performed the patient's entire operation until she was taken to recovery.      Disposition: Home after recovery in the PACU      Electronically signed by Prerna Narvaez MD on 10/8/2021 at 8:40 AM

## (undated) DEVICE — STRIP,CLOSURE,WOUND,MEDI-STRIP,1/2X4: Brand: MEDLINE

## (undated) DEVICE — TUBING, SUCTION, 1/4" X 20', STRAIGHT: Brand: MEDLINE INDUSTRIES, INC.

## (undated) DEVICE — SPONGE GZ W4XL4IN COT 12 PLY TYP VII WVN C FLD DSGN

## (undated) DEVICE — FD: Brand: NASOPORE

## (undated) DEVICE — 3M™ STERI-STRIP™ COMPOUND BENZOIN TINCTURE 40 BAGS/CARTON 4 CARTONS/CASE C1544: Brand: 3M™ STERI-STRIP™

## (undated) DEVICE — JELLY,LUBE,STERILE,FLIP TOP,TUBE,2-OZ: Brand: MEDLINE

## (undated) DEVICE — PACK-MAJOR

## (undated) DEVICE — GLOVE SURG SZ 75 L12IN FNGR THK94MIL TRNSLUC YEL LTX

## (undated) DEVICE — CODMAN® SURGICAL PATTIES 1/2" X 3" (1.27CM X 7.62CM): Brand: CODMAN®

## (undated) DEVICE — SPLINT 1529010 10PK THERMASPLINT MEDIUM